# Patient Record
(demographics unavailable — no encounter records)

---

## 2024-11-26 NOTE — ASSESSMENT
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 s/p adjuvant chemotherapy with CMF x 8 completed 1/1998. Then in March 2002 - patient developed lower back pain and pain the right leg. MRI of the spine revealed widespread mets throughout the entire spine, specifically in L5, where there was complete replacement of the vertebral body with pathologic fracture and epidural mass compressing the dural sac. The patient required steroids and RT to the lower spine. Biopsy of lumbar spine revealed metastatic adenocarcinoma consistent with breast origin ER 50%, OH negative and HER2 3+. s/p treatment with herceptin/vinorelbine/letrozole, and continues herceptin/letrozole since 11/2014 to the present time with no recurrence.  - previously discussed that treatment for metastatic breast cancer is not considered curable and the goal of therapy is palliative to stabilize or reduce the burden of disease for prolongation of life. She will continue on treatment until disease progression or if any unacceptable toxicities occur. -pt has been in complete clinical and radiologic remission since completing chemotherapy in 2002; to continue therapy with Herceptin Q3 weeks + daily letrozole; -continue to monitor echo q6-12mos; last echocardiogram October 2024 - stable EF; following with cardiology at [facility] yearly resumed in 5/2023 after completion of dental work  - Last CT Scans and Bone Scan: 2017, discussed additional imaging based on clinical symptoms and tumor markers - bw done with treatment - reviewed and stable; mild persistent anemia unchanged - last bone density in 2023 - mild osteopenia;  - Patient had the opportunity to have all their questions answered to their satisfaction - f/u in 3 months; continue every 3 week infusions;

## 2024-11-26 NOTE — HISTORY OF PRESENT ILLNESS
[de-identified] : 778year old female with history of metastatic breast cancer to the bones transferred care from [facility] to [facility] in 2021  Ashlee's history dates back to 1997 when at the age of 50, she noted a lump at 7 o'clock in the left breast.   Biopsy of mass disclosed a 1.4 cm invasive ductal carcoma . On August 7, 1997, Dr. Ramesh performed a left breast mastectomy and axillary dissection w/ Dr. Ed Acosta performing implant reconstruction. Final Pathology at that time ([facility]) was a Stage I ([id number]) invasive intraductal comedocarcinoma with 0/13 positive nodes.  ER was negative; CA was positive.  Adjuvant Chemotherapy was given:  CMF x 8 completed 1/1998.  March 2002 - patient developed lower back pain and pain the right leg.  MRI of the spine revealed widespread mets throughout the entire spine.  Severe disease was seen in the lumbar spine, specifically L5, where there was complete replacement of the vertebral body with pathologic fracture and epidural mass compressing the dural sac.  The patient required steroids and RT to the lower spine.  She was also started on Zometa at that time  Biopsy of lumbar spine revealed metastatic adenocarcinoma in bone consistent with breast origin ER 50%, CA negative and HER2 3+  (PATH REPORT IS NOT AVAILABLE)  Chemotherapy for metastatic disease: 3/2002 - 11/2002 - Herceptin/vinorelbine/zometa monthly 11/2002 - 2014 - Herceptin/Letrozole/Zometa 11/2014 - Present:  Herceptin Q 3 weeks + daily letrozole + Zometa annually   Ashlee has been in complete clinical and radiologic remission since completing chemotherapy in 2002.  Testing: Right breast Mammo/Sono 4/2021 at [facility] Echocardiogram: 10/2020 - EF 62% Dr. Shafer ([facility]); since 2021 with Dr. Rangel at [facility] every 6 months; Last 11/2022 CT Scans: 2017 Bone Scan:  2017 Genetics:  - NO RECORD OF GENETIC TESTING; maternal aunt age dx'd with metastatic HER2+ breast cancer, mother colon cancer age dx'd age 74, father stomach cancer age 70. No uterine, ovarian, prostate, pancreatic or melanoma.  No pregnancies, no OCP use, no HRT. Stopped smoking 22 years ago, used to smoke 1PPD x 30 years. Socialy ETOH 1/2 glasses/wine couple times/week.  Workout with  3x/week, also does Lenard.    [de-identified] : 11/26/24 Patient returns today to rule out progression of metastatic breast cancer and to assess treatment toxicity - Herceptin + letrozole  Since last visit, underwent reconstruction surgery with Dr. Moran Planning on going to [facility] in 1/2025 and [place] in 3/2025 Following with cardiology at [facility]; Echo on 10/7/24 with EF 60%  Patient denies any SOB, CP, abdominal pain, bone pain or headache. Patient denies any breast masses, breast tenderness, skin changes or nipple discharge.  Previously she was receiving zometa annually, Held while she was having dental work done by Dr. Freddie Lima - completed and annual Zometa in 5/2023 resumed*  Mammo/sono 4/2024 - Birads 2.0 (reviewed in PACS) Dr. Ramesh - annually   HEALTH MAINTENANCE: PCP:   Dr. Thomas Rodriguez, seeing q6mos DENTAL: [facility] - see above OPHTHO Dr. Sol, right wet macular degeneration, on eye drops CARDS: echocardiograms with Dr. Rangel; 10/2023 - moderate mitral regurgitation; referred to Dr. Narvaez but now following with [facility] cardiology Gyn:  no vaginal bleeding or spotting. no pap smear since age 70's, not following any longer GI y; repeat colonoscopy done by Dr. Bay in 7/2023 negative per patient URO early stage bladder stage cancer s/p excision in 2016, Dr. Rosi Salcido, seeing q6mos Derm: [facility], h/o skin biopsies COVID vaccine MODERNA 3/2021; booster 11/2/2021.   [Date: ____________] : Patient's last distress assessment performed on [unfilled]. [3 - Distress Level] : Distress Level: 3 [Patient given social work contact information and resource sheet] : Patient was given social work contact information and resource sheet [100: Normal, no complaints, no evidence of disease.] : 100: Normal, no complaints, no evidence of disease.

## 2024-11-26 NOTE — PHYSICAL EXAM
[Fully active, able to carry on all pre-disease performance without restriction] : Status 0 - Fully active, able to carry on all pre-disease performance without restriction [Normal] : affect appropriate [de-identified] : left mastectomy with implant replacement as of 10/2024 and right breast mastoplexy/reduction; no palpable masses bilaterally;; no axillary LAD

## 2025-02-20 NOTE — HISTORY OF PRESENT ILLNESS
[de-identified] : 778year old female with history of metastatic breast cancer to the bones transferred care from [facility] to [facility] in 2021  Ashlee's history dates back to 1997 when at the age of 50, she noted a lump at 7 o'clock in the left breast.   Biopsy of mass disclosed a 1.4 cm invasive ductal carcoma . On August 7, 1997, Dr. Ramesh performed a left breast mastectomy and axillary dissection w/ Dr. Ed Acosta performing implant reconstruction. Final Pathology at that time ([facility]) was a Stage I ([id number]) invasive intraductal comedocarcinoma with 0/13 positive nodes.  ER was negative; CA was positive.  Adjuvant Chemotherapy was given:  CMF x 8 completed 1/1998.  March 2002 - patient developed lower back pain and pain the right leg.  MRI of the spine revealed widespread mets throughout the entire spine.  Severe disease was seen in the lumbar spine, specifically L5, where there was complete replacement of the vertebral body with pathologic fracture and epidural mass compressing the dural sac.  The patient required steroids and RT to the lower spine.  She was also started on Zometa at that time  Biopsy of lumbar spine revealed metastatic adenocarcinoma in bone consistent with breast origin ER 50%, CA negative and HER2 3+  (PATH REPORT IS NOT AVAILABLE)  Chemotherapy for metastatic disease: 3/2002 - 11/2002 - Herceptin/vinorelbine/zometa monthly 11/2002 - 2014 - Herceptin/Letrozole/Zometa 11/2014 - Present:  Herceptin Q 3 weeks + daily letrozole + Zometa annually   Ashlee has been in complete clinical and radiologic remission since completing chemotherapy in 2002.  Testing: Right breast Mammo/Sono 4/2021 at [facility] Echocardiogram: 10/2020 - EF 62% Dr. Shafer ([facility]); since 2021 with Dr. Rangel at [facility] every 6 months; Last 11/2022 CT Scans: 2017 Bone Scan:  2017 Genetics:  - NO RECORD OF GENETIC TESTING; maternal aunt age dx'd with metastatic HER2+ breast cancer, mother colon cancer age dx'd age 74, father stomach cancer age 70. No uterine, ovarian, prostate, pancreatic or melanoma.  No pregnancies, no OCP use, no HRT. Stopped smoking 22 years ago, used to smoke 1PPD x 30 years. Socialy ETOH 1/2 glasses/wine couple times/week.  Workout with  3x/week, also does Lenard.    [de-identified] : 2/20/2025 Patient returns today to rule out progression of metastatic breast cancer and to assess treatment toxicity - Herceptin + letrozole  Since last visit, underwent reconstruction surgery with Dr. Moarn Travels to [place] regularly Following with cardiology at [facility]; Echo on 10/7/24 with EF 60% - next scheduled 4/2025 Patient denies any SOB, CP, abdominal pain, bone pain or headache. Patient denies any breast masses, breast tenderness, skin changes or nipple discharge.  Previously she was receiving zometa annually, Held while she was having dental work done by Dr. Freddie Lima - completed and annual Zometa in 5/2023 resumed*  Mammo/sono 4/2024 - Birads 2.0 (reviewed in PACS) Dr. Ramesh - annually   HEALTH MAINTENANCE: PCP:   Dr. Thomas Rodriguez, seeing q6mos DENTAL: [facility] - see above OPHTHO Dr. Sol, right wet macular degeneration, on eye drops CARDS: echocardiograms with Dr. Rangel; 10/2023 - moderate mitral regurgitation; referred to Dr. Narvaez but now following with [facility] cardiology Gyn:  no vaginal bleeding or spotting. no pap smear since age 70's, not following any longer GI y; repeat colonoscopy done by Dr. Bay in 7/2023 negative per patient URO early stage bladder stage cancer s/p excision in 2016, Dr. Rosi Salcido, seeing q6mos Derm: [facility], h/o skin biopsies COVID vaccine MODERNA 3/2021; booster 11/2/2021.   [Date: ____________] : Patient's last distress assessment performed on [unfilled]. [3 - Distress Level] : Distress Level: 3 [Patient given social work contact information and resource sheet] : Patient was given social work contact information and resource sheet [100: Normal, no complaints, no evidence of disease.] : 100: Normal, no complaints, no evidence of disease.

## 2025-02-20 NOTE — ASSESSMENT
[FreeTextEntry1] : 78 year old female with history of left breast Stage I ([id number]) invasive intraductal comedocarcinoma, s/p left mastectomy and axillary dissection, ER was negative; AK was positive. s/p adjuvant chemotherapy with CMF x 8 completed 1/1998. Then in March 2002 - patient developed lower back pain and pain the right leg. MRI of the spine revealed widespread mets throughout the entire spine, specifically in L5, where there was complete replacement of the vertebral body with pathologic fracture and epidural mass compressing the dural sac. The patient required steroids and RT to the lower spine. Biopsy of lumbar spine revealed metastatic adenocarcinoma consistent with breast origin ER 50%, AK negative and HER2 3+. s/p treatment with herceptin/vinorelbine/letrozole, and continues herceptin/letrozole since 11/2014 to the present time with no recurrence.  - previously discussed that treatment for metastatic breast cancer is not considered curable and the goal of therapy is palliative to stabilize or reduce the burden of disease for prolongation of life. She will continue on treatment until disease progression or if any unacceptable toxicities occur. -pt has been in complete clinical and radiologic remission since completing chemotherapy in 2002; to continue therapy with Herceptin Q3 weeks + daily letrozole; -continue to monitor echo q6-12mos; last echocardiogram October 2024 - stable EF; following with cardiology at [facility] yearly resumed in 5/2023 after completion of dental work  - Last CT Scans and Bone Scan: 2017, discussed additional imaging based on clinical symptoms and tumor markers - bw done with treatment - reviewed and stable; mild persistent anemia unchanged - last bone density in 2023 - mild osteopenia;  - Patient had the opportunity to have all their questions answered to their satisfaction - f/u in 3 months; continue every 3 week infusions;

## 2025-02-20 NOTE — PHYSICAL EXAM
[Fully active, able to carry on all pre-disease performance without restriction] : Status 0 - Fully active, able to carry on all pre-disease performance without restriction [Normal] : affect appropriate [de-identified] : left mastectomy with implant replacement as of 10/2024 and right breast mastoplexy/reduction; no palpable masses bilaterally;; no axillary LAD

## 2025-05-13 NOTE — PHYSICAL EXAM
[Fully active, able to carry on all pre-disease performance without restriction] : Status 0 - Fully active, able to carry on all pre-disease performance without restriction [Normal] : affect appropriate [de-identified] : left mastectomy with implant replacement as of 10/2024 and right breast mastoplexy/reduction; no palpable masses bilaterally; no axillary LAD

## 2025-05-13 NOTE — ASSESSMENT
[FreeTextEntry1] : 78 year old female with history of left breast Stage I ([id number]) invasive intraductal comedocarcinoma, s/p left mastectomy and axillary dissection, ER was negative; WA was positive. s/p adjuvant chemotherapy with CMF x 8 completed 1/1998. Then in March 2002 - patient developed lower back pain and pain the right leg. MRI of the spine revealed widespread mets throughout the entire spine, specifically in L5, where there was complete replacement of the vertebral body with pathologic fracture and epidural mass compressing the dural sac. The patient required steroids and RT to the lower spine. Biopsy of lumbar spine revealed metastatic adenocarcinoma consistent with breast origin ER 50%, WA negative and HER2 3+. s/p treatment with herceptin/vinorelbine/letrozole, and continues herceptin/letrozole since 11/2014 to the present time with no recurrence.  - previously discussed that treatment for metastatic breast cancer is not considered curable and the goal of therapy is palliative to stabilize or reduce the burden of disease for prolongation of life. She will continue on treatment until disease progression or if any unacceptable toxicities occur. -pt has been in complete clinical and radiologic remission since completing chemotherapy in 2002; to continue therapy with Herceptin Q3 weeks + daily letrozole; -continue to monitor echo q6-12mos; last echocardiogram April 2024- stable EF; following with cardiology at [facility]; to have repeat in 10/2025 - Zometa yearly resumed in 5/2023 after completion of dental work; next due in 6/2025 - Last CT Scans and Bone Scan: 2017, discussed additional imaging based on clinical symptoms and tumor markers - bw done with treatment - reviewed and stable; Hg improved to 11; fluctuates between 10-11.5 - last bone density in 5/2023 - mild osteopenia; will need repeat at this time; wishes to have done at [facility]; will print script  - Patient had the opportunity to have all their questions answered to their satisfaction - f/u in 3 months for office visit; continue every 3 week infusions;

## 2025-05-13 NOTE — HISTORY OF PRESENT ILLNESS
[Date: ____________] : Patient's last distress assessment performed on [unfilled]. [3 - Distress Level] : Distress Level: 3 [Patient given social work contact information and resource sheet] : Patient was given social work contact information and resource sheet [100: Normal, no complaints, no evidence of disease.] : 100: Normal, no complaints, no evidence of disease. [de-identified] : 78year old female with history of metastatic breast cancer to the bones transferred care from [facility] to [facility] in 2021  Ashlee's history dates back to 1997 when at the age of 50, she noted a lump at 7 o'clock in the left breast.   Biopsy of mass disclosed a 1.4 cm invasive ductal carcoma . On August 7, 1997, Dr. Ramesh performed a left breast mastectomy and axillary dissection w/ Dr. Ed Acosta performing implant reconstruction. Final Pathology at that time ([facility]) was a Stage I ([id number]) invasive intraductal comedocarcinoma with 0/13 positive nodes.  ER was negative; NE was positive.  Adjuvant Chemotherapy was given:  CMF x 8 completed 1/1998.  March 2002 - patient developed lower back pain and pain the right leg.  MRI of the spine revealed widespread mets throughout the entire spine.  Severe disease was seen in the lumbar spine, specifically L5, where there was complete replacement of the vertebral body with pathologic fracture and epidural mass compressing the dural sac.  The patient required steroids and RT to the lower spine.  She was also started on Zometa at that time  Biopsy of lumbar spine revealed metastatic adenocarcinoma in bone consistent with breast origin ER 50%, NE negative and HER2 3+  (PATH REPORT IS NOT AVAILABLE)  Chemotherapy for metastatic disease: 3/2002 - 11/2002 - Herceptin/vinorelbine/zometa monthly 11/2002 - 2014 - Herceptin/Letrozole/Zometa 11/2014 - Present:  Herceptin Q 3 weeks + daily letrozole + Zometa annually   Ashlee has been in complete clinical and radiologic remission since completing chemotherapy in 2002.  Testing: Right breast Mammo/Sono 4/2021 at [facility] Echocardiogram: 10/2020 - EF 62% Dr. Shafer ([facility]); since 2021 with Dr. Rangel at [facility] every 6 months; Last 11/2022 CT Scans: 2017 Bone Scan:  2017 Genetics:  - NO RECORD OF GENETIC TESTING; maternal aunt age dx'd with metastatic HER2+ breast cancer, mother colon cancer age dx'd age 74, father stomach cancer age 70. No uterine, ovarian, prostate, pancreatic or melanoma.  No pregnancies, no OCP use, no HRT. Stopped smoking 22 years ago, used to smoke 1PPD x 30 years. Socialy ETOH 1/2 glasses/wine couple times/week.  Workout with  3x/week, also does Lenard.    [de-identified] : 5/13/25 Patient returns today to rule out progression of metastatic breast cancer and to assess treatment toxicity - Herceptin + letrozole   in early 2025, she underwent reconstruction surgery with Dr. Moran Travels to [place] regularly; recent trip to [place] Following with cardiology at [facility]; Echo on 4/16/25 EF 63% - next  to be scheduled 10/2025 Patient denies any SOB, CP, abdominal pain, bone pain or headache. Patient denies any breast masses, breast tenderness, skin changes or nipple discharge.  Previously she was receiving zometa annually, Held while she was having dental work done by Dr. Freddie Lima - completed and annual Zometa in 5/2023 resumed  Mammo/sono 4/2024 - Birads 2.0 (reviewed in PACS) Dr. Ramesh - annually   HEALTH MAINTENANCE: PCP:   Dr. Thomas Rodriguez, seeing q6mos DENTAL: [facility] - see above OPHTHO Dr. Sol, right wet macular degeneration, on eye drops CARDS: echocardiograms with Dr. Rangel; 10/2023 - moderate mitral regurgitation; referred to Dr. Narvaez but now following with [facility] cardiology Gyn:  no vaginal bleeding or spotting. no pap smear since age 70's, not following any longer GI y; repeat colonoscopy done by Dr. Bay in 7/2023 negative per patient URO early stage bladder stage cancer s/p excision in 2016, Dr. Rosi Salcido, seeing q6mos Derm: [facility], h/o skin biopsies COVID vaccine MODERNA 3/2021; booster 11/2/2021.